# Patient Record
Sex: FEMALE | Race: WHITE | NOT HISPANIC OR LATINO | URBAN - NONMETROPOLITAN AREA
[De-identification: names, ages, dates, MRNs, and addresses within clinical notes are randomized per-mention and may not be internally consistent; named-entity substitution may affect disease eponyms.]

---

## 2018-12-07 PROCEDURE — 87510 GARDNER VAG DNA DIR PROBE: CPT | Performed by: NURSE PRACTITIONER

## 2018-12-07 PROCEDURE — 87480 CANDIDA DNA DIR PROBE: CPT | Performed by: NURSE PRACTITIONER

## 2018-12-07 PROCEDURE — 87660 TRICHOMONAS VAGIN DIR PROBE: CPT | Performed by: NURSE PRACTITIONER

## 2019-04-09 ENCOUNTER — APPOINTMENT (OUTPATIENT)
Dept: LAB | Facility: HOSPITAL | Age: 34
End: 2019-04-09

## 2019-04-09 ENCOUNTER — TELEPHONE (OUTPATIENT)
Dept: URGENT CARE | Facility: CLINIC | Age: 34
End: 2019-04-09

## 2019-04-09 PROCEDURE — 85652 RBC SED RATE AUTOMATED: CPT | Performed by: NURSE PRACTITIONER

## 2019-04-09 PROCEDURE — 85025 COMPLETE CBC W/AUTO DIFF WBC: CPT | Performed by: NURSE PRACTITIONER

## 2019-09-03 ENCOUNTER — OFFICE VISIT (OUTPATIENT)
Dept: OBSTETRICS AND GYNECOLOGY | Facility: CLINIC | Age: 34
End: 2019-09-03

## 2019-09-03 VITALS
DIASTOLIC BLOOD PRESSURE: 85 MMHG | BODY MASS INDEX: 31.52 KG/M2 | SYSTOLIC BLOOD PRESSURE: 121 MMHG | WEIGHT: 208 LBS | HEIGHT: 68 IN

## 2019-09-03 DIAGNOSIS — N89.8 VAGINAL DISCHARGE: ICD-10-CM

## 2019-09-03 DIAGNOSIS — Z01.419 ENCOUNTER FOR GYNECOLOGICAL EXAMINATION WITHOUT ABNORMAL FINDING: Primary | ICD-10-CM

## 2019-09-03 LAB
CANDIDA ALBICANS: POSITIVE
GARDNERELLA VAGINALIS: POSITIVE
T VAGINALIS DNA VAG QL PROBE+SIG AMP: NEGATIVE

## 2019-09-03 PROCEDURE — 99385 PREV VISIT NEW AGE 18-39: CPT | Performed by: OBSTETRICS & GYNECOLOGY

## 2019-09-03 PROCEDURE — 87510 GARDNER VAG DNA DIR PROBE: CPT | Performed by: OBSTETRICS & GYNECOLOGY

## 2019-09-03 PROCEDURE — 87480 CANDIDA DNA DIR PROBE: CPT | Performed by: OBSTETRICS & GYNECOLOGY

## 2019-09-03 PROCEDURE — 87660 TRICHOMONAS VAGIN DIR PROBE: CPT | Performed by: OBSTETRICS & GYNECOLOGY

## 2019-09-03 RX ORDER — NICOTINE 14MG/24HR
1 PATCH, TRANSDERMAL 24 HOURS TRANSDERMAL DAILY
Qty: 30 CAPSULE | Refills: 3 | Status: SHIPPED | OUTPATIENT
Start: 2019-09-03

## 2019-09-03 NOTE — PROGRESS NOTES
[facility]  ANNUAL WELL WOMAN EXAM    HPI  Della Sanchez is a 34 y.o.  premenopausal female who presents for her annual well woman exam.  Reports that she has had 3 BV infections in the past year; last one was 3 months ago.  Currently denies any vaginal itching, burning, irritation, or discharge.  Denies any abnormal uterine bleeding.  Continues to be sexually active.  Denies any sexual dysfunction concerns.  Denies any urinary symptoms including incontinence, dysuria, frequency, urgency, nocturia.    ROS  Review of Systems   Constitutional: Negative.    HENT: Negative.    Eyes: Negative.    Respiratory: Negative.    Cardiovascular: Negative.    Gastrointestinal: Negative.    Endocrine: Negative.    Genitourinary: Negative.    Musculoskeletal: Negative.    Skin: Negative.    Allergic/Immunologic: Negative.    Neurological: Negative.    Hematological: Negative.    Psychiatric/Behavioral: Negative.      HEALTH MAINTENANCE  Mammogram: N/A  Colonoscopy: N/A  DEXA scan: N/A  Pap smear:   Last Completed Pap Smear       Status Date      PAP SMEAR Done 4/15/2013 CONVERTED (HISTORICAL) GYN CYTOLOGY     Patient has more history with this topic...        GYN HISTORY  Menarche: age 10  S/p NEDRA/BS/RO secondary to endometriosis  History of STI: negative  Last pap smear:   Last Completed Pap Smear       Status Date      PAP SMEAR Done 4/15/2013 CONVERTED (HISTORICAL) GYN CYTOLOGY     Patient has more history with this topic...      Abnormal pap smear history: - LSIL; none since that time  Contraception: N/A    OB HISTORY  OB History    Para Term  AB Living   4 4 3 1   3   SAB TAB Ectopic Molar Multiple Live Births             3      # Outcome Date GA Lbr Dmitri/2nd Weight Sex Delivery Anes PTL Lv   4 Term 06    F CS-LTranv EPI  ASHER   3 Term 04    M CS-LTranv EPI N ASHER   2 Term 10/02/02    F CS-LTranv EPI N ASHER      Complications: Fetal Intolerance   1  00 20w0d   " M Vag-Spont   FD        PAST MEDICAL HISTORY  Past Medical History:   Diagnosis Date   • Abnormal Pap smear of cervix    • Endometriosis    • Herpes    • HPV (human papilloma virus) infection    • Ovarian cyst        PAST SURGICAL HISTORY  Past Surgical History:   Procedure Laterality Date   • CHOLECYSTECTOMY     • HYSTERECTOMY  2013    bilateral fallopian tubes, right ovary; endometriosis       FAMILY HISTORY  History reviewed. No pertinent family history.    SOCIAL HISTORY  Social History     Socioeconomic History   • Marital status:      Spouse name: Not on file   • Number of children: Not on file   • Years of education: Not on file   • Highest education level: Not on file   Tobacco Use   • Smoking status: Former Smoker   • Smokeless tobacco: Never Used   Substance and Sexual Activity   • Alcohol use: No     Frequency: Never   • Drug use: No   • Sexual activity: Yes     HOME MEDICATIONS  Prior to Admission medications    Not on File     ALLERGIES  Allergies   Allergen Reactions   • Rocephin [Ceftriaxone] Shortness Of Breath     PE  /85   Ht 172.7 cm (68\")   Wt 94.3 kg (208 lb)   LMP  (LMP Unknown)   BMI 31.63 kg/m²        General: Alert, healthy, no distress, well nourished and well developed   Neurologic: Alert, oriented to person, place, and time.  Gait normal.  Cranial nerves II-XII grossly intact.  HEENT: Normocephalic, atraumatic.  Extraocular muscles intact, pupils equal and reactive times two.    Neck: Supple, no adenopathy, thyroid normal size, non-tender, without nodularity, trachea midline   Lungs: Normal respiratory effort.  Clear to auscultation bilaterally.   Heart: Regular rate and rhythm, without murmer, rub or gallop.   Abdomen: Soft, non-tender, non-distended,no masses, no hepatosplenomegaly, no hernia.    Skin: No rash, no lesions.  Extremities: No cyanosis, clubbing or edema  PELVIC EXAM:  External Genitalia/Vulva: Anatomy is normal, no significant redness of labia, no " discharge on vulvar tissues, [place]'s and Bartholin's glands are normal, no ulcers, no condylomatous lesions    Urethra: Normal, no lesions   Vagina: Vaginal tissues are not inflamed, normal color and texture.  Vaginal cuff intact.  Thin white discharge noted.  Cervix: ABSENT  Uterus: ABSENT  Adnexa: ABSENT    IMPRESSION  Della Sanchez is a 34 y.o.  presenting for annual gynecological exam.  Suspect that she has BV again.    PLAN    1. Encounter for gynecological examination without abnormal finding  - RTC 1 year    2. Vaginal discharge  - Recommend probiotic daily to help prevent BV ifnections  - Gardnerella vaginalis, Trichomonas vaginalis, Candida albicans, DNA - Swab, Vagina      This document has been electronically signed by Bhumi Smith MD on September 3, 2019 3:15 PM.    CC: Provider, No Known

## 2019-09-04 ENCOUNTER — TELEPHONE (OUTPATIENT)
Dept: OBSTETRICS AND GYNECOLOGY | Facility: CLINIC | Age: 34
End: 2019-09-04

## 2019-09-04 RX ORDER — FLUCONAZOLE 150 MG/1
150 TABLET ORAL DAILY
Qty: 2 TABLET | Refills: 0 | Status: SHIPPED | OUTPATIENT
Start: 2019-09-04 | End: 2019-12-15

## 2019-09-04 RX ORDER — METRONIDAZOLE 500 MG/1
500 TABLET ORAL 2 TIMES DAILY
Qty: 14 TABLET | Refills: 0 | Status: SHIPPED | OUTPATIENT
Start: 2019-09-04 | End: 2019-09-11

## 2019-09-04 NOTE — TELEPHONE ENCOUNTER
----- Message from Bhumi Smith MD sent at 9/3/2019  5:23 PM CDT -----  Positive for yeast and BV.  Diflucan 150mg q3d x2 doses, Flagyl 500mg BID x7 days.

## 2023-05-29 PROCEDURE — 87081 CULTURE SCREEN ONLY: CPT | Performed by: NURSE PRACTITIONER
